# Patient Record
Sex: MALE | Race: BLACK OR AFRICAN AMERICAN | NOT HISPANIC OR LATINO | URBAN - NONMETROPOLITAN AREA
[De-identification: names, ages, dates, MRNs, and addresses within clinical notes are randomized per-mention and may not be internally consistent; named-entity substitution may affect disease eponyms.]

---

## 2019-04-24 NOTE — PATIENT DISCUSSION
(H11.122) Conjunctival concretions, left eye - Assesment : Examination revealed conjunctival concretions.  - Plan : See plan # 1

## 2019-04-24 NOTE — PATIENT DISCUSSION
(H57.12) Ocular pain, left eye - Assesment : Patient presented today with subjective eye pain that began suddenly today. He noted some swelling in the eyelid. Examination revealed Conjunctival concretions. No surgical intervention warranted. - Plan : Continue warm compresses. Recommend using lubricating drops especially at night. Advised to use PF Ats TID/QID OU. Suggestions: PF  Blink, Optive, GenTeal,  Thera Tears or optive. Coupon for Thera Tears given. Sample Soothe  XP given.     RTC PRN

## 2021-11-10 ENCOUNTER — IMPORTED ENCOUNTER (OUTPATIENT)
Dept: URBAN - NONMETROPOLITAN AREA CLINIC 1 | Facility: CLINIC | Age: 66
End: 2021-11-10

## 2021-11-10 PROBLEM — H52.03: Noted: 2021-11-10

## 2021-11-10 PROBLEM — H16.223: Noted: 2021-11-10

## 2021-11-10 PROBLEM — H52.223: Noted: 2021-11-10

## 2021-11-10 PROBLEM — H52.4: Noted: 2021-11-10

## 2021-11-10 PROBLEM — H40.013: Noted: 2021-11-10

## 2021-11-10 PROBLEM — H25.813: Noted: 2021-11-10

## 2021-11-10 PROCEDURE — 99204 OFFICE O/P NEW MOD 45 MIN: CPT

## 2021-11-10 PROCEDURE — 92015 DETERMINE REFRACTIVE STATE: CPT

## 2021-11-10 PROCEDURE — 76514 ECHO EXAM OF EYE THICKNESS: CPT

## 2021-11-10 PROCEDURE — 92133 CPTRZD OPH DX IMG PST SGM ON: CPT

## 2021-11-10 NOTE — PATIENT DISCUSSION
Cataract OU-Not yet surgical. -Reviewed symptoms of advancing cataract growth such as glare and halos and decreased vision.-Continue to monitor for now. Pt will notify us if any new symptoms develop. BONG OU-Use REFRESH RELIEVA PRN OU samples and coupons givenGlaucoma Suspect-Based on C/D asymmetry (no family history)-Appears stable at this time.-Continue to monitor with exams and testing. Order pachymetry and ON OCT reviewedHAP OU-Rx issued for new glasses.

## 2022-04-10 ASSESSMENT — TONOMETRY
OS_IOP_MMHG: 18
OD_IOP_MMHG: 18

## 2022-04-10 ASSESSMENT — VISUAL ACUITY
OS_SC: 20/30
OD_SC: 20/20

## 2022-11-10 ENCOUNTER — ESTABLISHED PATIENT (OUTPATIENT)
Dept: RURAL CLINIC 2 | Facility: CLINIC | Age: 67
End: 2022-11-10

## 2022-11-10 DIAGNOSIS — H16.223: ICD-10-CM

## 2022-11-10 DIAGNOSIS — H52.03: ICD-10-CM

## 2022-11-10 DIAGNOSIS — H52.4: ICD-10-CM

## 2022-11-10 DIAGNOSIS — H40.013: ICD-10-CM

## 2022-11-10 DIAGNOSIS — H52.223: ICD-10-CM

## 2022-11-10 DIAGNOSIS — H25.813: ICD-10-CM

## 2022-11-10 PROCEDURE — 92133 CPTRZD OPH DX IMG PST SGM ON: CPT

## 2022-11-10 PROCEDURE — 92015 DETERMINE REFRACTIVE STATE: CPT

## 2022-11-10 PROCEDURE — 92014 COMPRE OPH EXAM EST PT 1/>: CPT

## 2022-11-10 ASSESSMENT — VISUAL ACUITY
OS_CC: 20/25
OD_CC: 20/20
OU_CC: J1+

## 2022-11-10 ASSESSMENT — TONOMETRY
OS_IOP_MMHG: 17
OD_IOP_MMHG: 16